# Patient Record
Sex: FEMALE | Race: OTHER | Employment: FULL TIME | URBAN - METROPOLITAN AREA
[De-identification: names, ages, dates, MRNs, and addresses within clinical notes are randomized per-mention and may not be internally consistent; named-entity substitution may affect disease eponyms.]

---

## 2020-11-08 ENCOUNTER — HOSPITAL ENCOUNTER (EMERGENCY)
Age: 25
Discharge: HOME | End: 2020-11-08
Attending: STUDENT IN AN ORGANIZED HEALTH CARE EDUCATION/TRAINING PROGRAM
Payer: SUBSIDIZED

## 2020-11-08 ENCOUNTER — APPOINTMENT (OUTPATIENT)
Dept: GENERAL RADIOLOGY | Age: 25
End: 2020-11-08
Attending: STUDENT IN AN ORGANIZED HEALTH CARE EDUCATION/TRAINING PROGRAM
Payer: SUBSIDIZED

## 2020-11-08 VITALS
TEMPERATURE: 98.4 F | SYSTOLIC BLOOD PRESSURE: 120 MMHG | BODY MASS INDEX: 25.76 KG/M2 | HEIGHT: 62 IN | WEIGHT: 140 LBS | HEART RATE: 87 BPM | OXYGEN SATURATION: 99 % | DIASTOLIC BLOOD PRESSURE: 63 MMHG | RESPIRATION RATE: 16 BRPM

## 2020-11-08 DIAGNOSIS — S93.402A SPRAIN OF LEFT ANKLE, UNSPECIFIED LIGAMENT, INITIAL ENCOUNTER: Primary | ICD-10-CM

## 2020-11-08 PROCEDURE — 73610 X-RAY EXAM OF ANKLE: CPT

## 2020-11-08 PROCEDURE — 99282 EMERGENCY DEPT VISIT SF MDM: CPT

## 2020-11-08 RX ORDER — IBUPROFEN 800 MG/1
800 TABLET ORAL
Qty: 20 TAB | Refills: 0 | Status: SHIPPED | OUTPATIENT
Start: 2020-11-08 | End: 2020-11-15

## 2020-11-08 NOTE — DISCHARGE INSTRUCTIONS
Patient Education        Ankle Sprain: Care Instructions  Your Care Instructions     An ankle sprain can happen when you twist your ankle. The ligaments that support the ankle can get stretched and torn. Often the ankle is swollen and painful. Ankle sprains may take from several weeks to several months to heal. Usually, the more pain and swelling you have, the more severe your ankle sprain is and the longer it will take to heal. You can heal faster and regain strength in your ankle with good home treatment. It is very important to give your ankle time to heal completely, so that you do not easily hurt your ankle again. Follow-up care is a key part of your treatment and safety. Be sure to make and go to all appointments, and call your doctor if you are having problems. It's also a good idea to know your test results and keep a list of the medicines you take. How can you care for yourself at home? · Prop up your foot on pillows as much as possible for the next 3 days. Try to keep your ankle above the level of your heart. This will help reduce the swelling. · Follow your doctor's directions for wearing a splint or elastic bandage. Wrapping the ankle may help reduce or prevent swelling. · Your doctor may give you a splint, a brace, an air stirrup, or another form of ankle support to protect your ankle until it is healed. Wear it as directed while your ankle is healing. Do not remove it unless your doctor tells you to. After your ankle has healed, ask your doctor whether you should wear the brace when you exercise. · Put ice or cold packs on your injured ankle for 10 to 20 minutes at a time. Try to do this every 1 to 2 hours for the next 3 days (when you are awake) or until the swelling goes down. Put a thin cloth between the ice and your skin. · You may need to use crutches until you can walk without pain. If you do use crutches, try to bear some weight on your injured ankle if you can do so without pain.  This helps the ankle heal.  · Take pain medicines exactly as directed. ? If the doctor gave you a prescription medicine for pain, take it as prescribed. ? If you are not taking a prescription pain medicine, ask your doctor if you can take an over-the-counter medicine. · If you have been given ankle exercises to do at home, do them exactly as instructed. These can promote healing and help prevent lasting weakness. When should you call for help? Call your doctor now or seek immediate medical care if:    · Your pain is getting worse.     · Your swelling is getting worse.     · Your splint feels too tight or you are unable to loosen it. Watch closely for changes in your health, and be sure to contact your doctor if:    · You are not getting better after 1 week. Where can you learn more? Go to http://www.gray.com/  Enter Q745 in the search box to learn more about \"Ankle Sprain: Care Instructions. \"  Current as of: March 2, 2020               Content Version: 12.6  © 1461-4402 [employer]. Care instructions adapted under license by [employer] (which disclaims liability or warranty for this information). If you have questions about a medical condition or this instruction, always ask your healthcare professional. [place] [address] any warranty or liability for your use of this information. Patient Education        Ankle Sprain: Rehab Exercises  Introduction  Here are some examples of exercises for you to try. The exercises may be suggested for a condition or for rehabilitation. Start each exercise slowly. Ease off the exercises if you start to have pain. You will be told when to start these exercises and which ones will work best for you. How to do the exercises  \"Alphabet\" exercise   1. Trace the alphabet with your toe. This helps your ankle move in all directions. Side-to-side knee swing exercise   1.  Sit in a chair with your foot flat on the floor. 2. Slowly move your knee from side to side. Keep your foot pressed flat. 3. Continue this exercise for 2 to 3 minutes. Towel curl   1. While sitting, place your foot on a towel on the floor. Scrunch the towel toward you with your toes. 2. Then use your toes to push the towel away from you. 3. To make this exercise more challenging you can put something on the other end of the towel. A can of soup is about the right weight for this. Towel stretch   1. Sit with your legs extended and knees straight. 2. Place a towel around your foot just under the toes. 3. Hold each end of the towel in each hand, with your hands above your knees. 4. Pull back with the towel so that your foot stretches toward you. 5. Hold the position for at least 15 to 30 seconds. 6. Repeat 2 to 4 times a session. Do up to 5 sessions a day. Ankle eversion exercise   1. Start by sitting with your foot flat on the floor. Push your foot outward against a wall or a piece of furniture that doesn't move. Hold for about 6 seconds, and relax. Repeat 8 to 12 times. 2. After you feel comfortable with this, try using rubber tubing looped around the outside of your feet for resistance. Push your foot out to the side against the tubing, and then count to 10 as you slowly bring your foot back to the middle. Repeat 8 to 12 times. Isometric opposition exercises   1. While sitting, put your feet together flat on the floor. 2. Press your injured foot inward against your other foot. Hold for about 6 seconds, and relax. Repeat 8 to 12 times. 3. Then place the heel of your other foot on top of the injured one. Push down with the top heel while trying to push up with your injured foot. Hold for about 6 seconds, and relax. Repeat 8 to 12 times. Resisted ankle inversion   1. Sit on the floor with your good leg crossed over your other leg. 2. Hold both ends of an exercise band and loop the band around the inside of your affected foot. Then press your other foot against the band. 3. Keeping your legs crossed, slowly push your affected foot against the band so that foot moves away from your other foot. Then slowly relax. 4. Repeat 8 to 12 times. Resisted ankle eversion   1. Sit on the floor with your legs straight. 2. Hold both ends of an exercise band and loop the band around the outside of your affected foot. Then press your other foot against the band. 3. Keeping your leg straight, slowly push your affected foot outward against the band and away from your other foot without letting your leg rotate. Then slowly relax. 4. Repeat 8 to 12 times. Resisted ankle dorsiflexion   1. Tie the ends of an exercise band together to form a loop. Attach one end of the loop to a secure object or shut a door on it to hold it in place. (Or you can have someone hold one end of the loop to provide resistance.)  2. While sitting on the floor or in a chair, loop the other end of the band over the top of your affected foot. 3. Keeping your knee and leg straight, slowly flex your foot to pull back on the exercise band, and then slowly relax. 4. Repeat 8 to 12 times. Single-leg balance   1. Stand on a flat surface with your arms stretched out to your sides like you are making the letter \"T. \" Then lift your good leg off the floor, bending it at the knee. If you are not steady on your feet, use one hand to hold on to a chair, counter, or wall. 2. Standing on the leg with your affected ankle, keep that knee straight. Try to balance on that leg for up to 30 seconds. Then rest for up to 10 seconds. 3. Repeat 6 to 8 times. 4. When you can balance on your affected leg for 30 seconds with your eyes open, try to balance on it with your eyes closed. 5. When you can do this exercise with your eyes closed for 30 seconds and with ease and no pain, try standing on a pillow or piece of foam, and repeat steps 1 through 4.     Follow-up care is a key part of your treatment and safety. Be sure to make and go to all appointments, and call your doctor if you are having problems. It's also a good idea to know your test results and keep a list of the medicines you take. Where can you learn more? Go to http://www.gray.com/  Enter Yung Galdamez in the search box to learn more about \"Ankle Sprain: Rehab Exercises. \"  Current as of: March 2, 2020               Content Version: 12.6  © 2006-2020 [employer]. Care instructions adapted under license by [employer] (which disclaims liability or warranty for this information). If you have questions about a medical condition or this instruction, always ask your healthcare professional. [address] any warranty or liability for your use of this information.

## 2020-11-08 NOTE — ED NOTES
I have reviewed discharge instructions with the patient. The patient verbalized understanding. Patient left ED via Discharge Method: ambulatory to Home with family. Opportunity for questions and clarification provided. Patient given 1 scripts. To continue your aftercare when you leave the hospital, you may receive an automated call from our care team to check in on how you are doing. This is a free service and part of our promise to provide the best care and service to meet your aftercare needs.  If you have questions, or wish to unsubscribe from this service please call [phone]. Thank you for Choosing our [facility] Emergency Department.

## 2020-11-08 NOTE — ED PROVIDER NOTES
78-year-old female patient presents with reports of left ankle pain after fall yesterday. Patient states she was playing volleyball and stepped awkwardly, describing an inversion injury of her left ankle. She denies any other injury at the time. Notes swelling and pain to the lateral aspect of the left ankle. She also notes reduced range of motion in the third fourth and fifth toe on this foot. No other complaints at this time. History reviewed. No pertinent past medical history. History reviewed. No pertinent surgical history. History reviewed. No pertinent family history.     Social History     Socioeconomic History    Marital status: Not on file     Spouse name: Not on file    Number of children: Not on file    Years of education: Not on file    Highest education level: Not on file   Occupational History    Not on file   Social Needs    Financial resource strain: Not on file    Food insecurity     Worry: Not on file     Inability: Not on file    Transportation needs     Medical: Not on file     Non-medical: Not on file   Tobacco Use    Smoking status: Never Smoker    Smokeless tobacco: Never Used   Substance and Sexual Activity    Alcohol use: Never     Frequency: Never    Drug use: Never    Sexual activity: Not on file   Lifestyle    Physical activity     Days per week: Not on file     Minutes per session: Not on file    Stress: Not on file   Relationships    Social connections     Talks on phone: Not on file     Gets together: Not on file     Attends [employer] service: Not on file     Active member of club or organization: Not on file     Attends meetings of clubs or organizations: Not on file     Relationship status: Not on file    Intimate partner violence     Fear of current or ex partner: Not on file     Emotionally abused: Not on file     Physically abused: Not on file     Forced sexual activity: Not on file   Other Topics Concern    Not on file   Social History Narrative    Not on file         ALLERGIES: Patient has no known allergies. Review of Systems   Constitutional: Negative for chills, diaphoresis and fever. HENT: Negative for congestion, sneezing and sore throat. Eyes: Negative for visual disturbance. Respiratory: Negative for cough, chest tightness, shortness of breath and wheezing. Cardiovascular: Negative for chest pain and leg swelling. Gastrointestinal: Negative for abdominal pain, blood in stool, diarrhea, nausea and vomiting. Endocrine: Negative for polyuria. Genitourinary: Negative for difficulty urinating, dysuria, flank pain, hematuria and urgency. Musculoskeletal: Positive for arthralgias and joint swelling. Negative for back pain, myalgias, neck pain and neck stiffness. Skin: Negative for color change and rash. Neurological: Negative for dizziness, syncope, speech difficulty, weakness, light-headedness, numbness and headaches. Psychiatric/Behavioral: Negative for behavioral problems. All other systems reviewed and are negative. Vitals:    11/08/20 0915   BP: 120/63   Pulse: 87   Resp: 16   Temp: 98.4 °F (36.9 °C)   SpO2: 99%   Weight: 63.5 kg (140 lb)   Height: 5' 2\" (1.575 m)            Physical Exam  Vitals signs and nursing note reviewed. Constitutional:       Appearance: Normal appearance. HENT:      Head: Normocephalic and atraumatic. Nose: Nose normal.      Mouth/Throat:      Mouth: Mucous membranes are dry. Eyes:      Extraocular Movements: Extraocular movements intact. Pupils: Pupils are equal, round, and reactive to light. Neck:      Musculoskeletal: Normal range of motion. Cardiovascular:      Pulses: Normal pulses. Heart sounds: Normal heart sounds. Pulmonary:      Effort: Pulmonary effort is normal. No respiratory distress. Breath sounds: Normal breath sounds. Abdominal:      General: Abdomen is flat. Musculoskeletal: Normal range of motion.       Comments: Swelling to the lateral aspect of the left ankle with pain on palpation. Anterior tibial, Dorsalis pedis pulses easily palpable, distal cap refill normal, no evidence of significant ecchymosis or other deformity on exam.   Skin:     General: Skin is warm and dry. Capillary Refill: Capillary refill takes less than 2 seconds. Neurological:      General: No focal deficit present. Mental Status: She is alert. Psychiatric:         Mood and Affect: Mood normal.          MDM  Number of Diagnoses or Management Options  Sprain of left ankle, unspecified ligament, initial encounter: new and does not require workup  Diagnosis management comments: X-ray imaging shows no evidence of acute fracture or dislocation. Symptoms consistent with ankle sprain. Patient already has crutches to use. Will place her in a stirrup splint and referred to orthopedics. She states pain is been well controlled with ibuprofen at home. Encouraged the regular use of ice and elevation as well. Voice dictation software was used during the making of this note. This software is not perfect and grammatical and other typographical errors may be present. This note has been proofread, but may still contain errors.   Kelly Barrera DO; 11/8/2020 @10:29 AM   ===================================================================         Amount and/or Complexity of Data Reviewed  Independent visualization of images, tracings, or specimens: yes    Risk of Complications, Morbidity, and/or Mortality  Presenting problems: moderate  Diagnostic procedures: low  Management options: moderate    Patient Progress  Patient progress: stable         Procedures

## 2020-11-08 NOTE — ED TRIAGE NOTES
Patient ad vises playing volleyball yesterday and stepped back into a hole and fell. Patient complaining of left ankle pain with swelling present. Patient with mask on during triage. Patient denies any loss of consciousness during event.

## 2022-08-28 ENCOUNTER — APPOINTMENT (OUTPATIENT)
Dept: GENERAL RADIOLOGY | Age: 27
End: 2022-08-28

## 2022-08-28 ENCOUNTER — HOSPITAL ENCOUNTER (EMERGENCY)
Age: 27
Discharge: HOME | End: 2022-08-28
Attending: EMERGENCY MEDICINE | Admitting: EMERGENCY MEDICINE

## 2022-08-28 VITALS
SYSTOLIC BLOOD PRESSURE: 125 MMHG | BODY MASS INDEX: 24.84 KG/M2 | TEMPERATURE: 99.1 F | DIASTOLIC BLOOD PRESSURE: 84 MMHG | HEART RATE: 95 BPM | OXYGEN SATURATION: 100 % | RESPIRATION RATE: 15 BRPM | WEIGHT: 135 LBS | HEIGHT: 62 IN

## 2022-08-28 DIAGNOSIS — S92.355A CLOSED NONDISPLACED FRACTURE OF FIFTH METATARSAL BONE OF LEFT FOOT, INITIAL ENCOUNTER: Primary | ICD-10-CM

## 2022-08-28 PROCEDURE — 73630 X-RAY EXAM OF FOOT: CPT

## 2022-08-28 PROCEDURE — 99283 EMERGENCY DEPT VISIT LOW MDM: CPT | Performed by: EMERGENCY MEDICINE

## 2022-08-28 PROCEDURE — 73610 X-RAY EXAM OF ANKLE: CPT

## 2022-08-28 RX ORDER — IBUPROFEN 800 MG/1
800 TABLET ORAL
Qty: 21 TABLET | Refills: 0 | Status: SHIPPED | OUTPATIENT
Start: 2022-08-28 | End: 2022-09-04

## 2022-08-28 ASSESSMENT — PAIN SCALES - GENERAL: PAINLEVEL_OUTOF10: 2

## 2022-08-28 ASSESSMENT — ENCOUNTER SYMPTOMS: SHORTNESS OF BREATH: 0

## 2022-08-28 ASSESSMENT — PAIN DESCRIPTION - LOCATION: LOCATION: ANKLE

## 2022-08-28 ASSESSMENT — PAIN DESCRIPTION - ORIENTATION: ORIENTATION: LEFT

## 2022-08-28 ASSESSMENT — PAIN DESCRIPTION - DESCRIPTORS: DESCRIPTORS: ACHING

## 2022-08-28 ASSESSMENT — PAIN - FUNCTIONAL ASSESSMENT: PAIN_FUNCTIONAL_ASSESSMENT: 0-10

## 2022-08-28 NOTE — DISCHARGE INSTRUCTIONS
You need to wear the walking boot anytime you are up ambulating and use crutches, you are to be nonweightbearing until you see orthopedics. Ice and elevate to help with swelling. Alternate tylenol and motrin as needed for fever or body aches. You can take tylenol every 4 hours as needed. You can take ibuprofen every 8 hours as needed. Schedule a follow up appointment with [address] [place] [address] with Dr. Bobo Gerardo or Dr. Jenifer Mcarthur. Return to the ER for any new or worsening symptoms.

## 2022-08-28 NOTE — ED NOTES
I have reviewed discharge instructions with the patient. The patient verbalized understanding. Patient left ED via Discharge Method: wheelchair to Home with family    Opportunity for questions and clarification provided. Patient given 1 scripts. To continue your aftercare when you leave the hospital, you may receive an automated call from our care team to check in on how you are doing. This is a free service and part of our promise to provide the best care and service to meet your aftercare needs.  If you have questions, or wish to unsubscribe from this service please call [phone]. Thank you for Choosing our [facility] Emergency Department.         Lonny Hernandez RN  08/28/22 1261

## 2022-08-28 NOTE — ED PROVIDER NOTES
[facility] Emergency Department Provider Note                   PCP:                None Provider               Age: 32 y.o. Sex: female       ICD-10-CM    1. Closed nondisplaced fracture of fifth metatarsal bone of left foot, initial encounter  S92.355A           DISPOSITION Decision To Discharge 08/28/2022 04:30:00 PM        MDM  Number of Diagnoses or Management Options  Closed nondisplaced fracture of fifth metatarsal bone of left foot, initial encounter  Diagnosis management comments:     Overall patient is a well-appearing 64-year-old female who presented today for evaluation of left foot injury. On exam there is some swelling, ecchymosis and tenderness noted to the dorsum of the left foot at the proximal aspect over the fourth and fifth metatarsals. She is neurovascularly intact distally. Her x-rays today show a proximal fifth metatarsal fracture. I discussed with on-call orthopedic PA who recommended a walking boot and crutches, patient is to be nonweightbearing and will follow-up in the office. Discussed the plan with the patient who is in agreement with the plan. Amount and/or Complexity of Data Reviewed  Tests in the radiology section of CPT®: ordered and reviewed         Orders Placed This Encounter   Procedures    XR ANKLE LEFT (MIN 3 VIEWS)    XR FOOT LEFT (MIN 3 VIEWS)        Medications - No data to display    Discharge Medication List as of 8/28/2022  4:42 PM        START taking these medications    Details   ibuprofen (ADVIL;MOTRIN) 800 MG tablet Take 1 tablet by mouth 3 times daily (with meals) for 7 days, Disp-21 tablet, R-0Print              Marv Beltrán is a 32 y.o. female who presents to the Emergency Department with chief complaint of    Chief Complaint   Patient presents with    Ankle Pain      64-year-old well-appearing female presents today for evaluation of left foot pain and swelling.   She states that 2 days ago she was playing soccer and she felt herself \"rolled the ankle\". She states that she has had some pain in the left ankle and foot region ever since making it difficult to ambulate. She noticed some increased swelling and ecchymosis today which is what prompted her visit. Pain is worsened with weightbearing, improved with rest.  She denies any numbness or tingling in the foot. Reports previous sprained ankle but no history of previous surgery on this ankle or foot. The history is provided by the patient. No  was used. Review of Systems   Constitutional:  Negative for activity change. Respiratory:  Negative for shortness of breath. Musculoskeletal:  Positive for joint swelling. Left ankle and foot pain   Allergic/Immunologic: Negative for immunocompromised state. Neurological:  Negative for weakness and numbness. Hematological:  Does not bruise/bleed easily. No past medical history on file. No past surgical history on file. No family history on file. Social History     Socioeconomic History    Marital status: Single   Tobacco Use    Smoking status: Never    Smokeless tobacco: Never   Substance and Sexual Activity    Alcohol use: Never    Drug use: Never         Patient has no known allergies. Discharge Medication List as of 8/28/2022  4:42 PM           Vitals signs and nursing note reviewed. No data found. Physical Exam  Vitals and nursing note reviewed. Constitutional:       General: She is not in acute distress. Appearance: Normal appearance. HENT:      Head: Normocephalic and atraumatic. Eyes:      Conjunctiva/sclera: Conjunctivae normal.   Pulmonary:      Effort: Pulmonary effort is normal.   Musculoskeletal:      Left ankle: No swelling or ecchymosis. Normal range of motion. Left foot: Normal capillary refill. Swelling and bony tenderness present. Normal pulse. Legs:    Skin:     General: Skin is warm and dry.       Capillary Refill: Capillary refill takes less than 2 seconds. Neurological:      General: No focal deficit present. Mental Status: She is alert and oriented to person, place, and time. Psychiatric:         Mood and Affect: Mood normal.         Thought Content: Thought content normal.         Judgment: Judgment normal.        Procedures      [unfilled]     XR FOOT LEFT (MIN 3 VIEWS)   Final Result   Proximal fifth metatarsal fracture         XR ANKLE LEFT (MIN 3 VIEWS)   Final Result   Proximal fifth metatarsal fracture                          ED Course as of 08/28/22 1956   Sun Aug 28, 2022   0799 INDICATION: Trauma, left ankle pain     Three views of the left ankle were obtained     FINDINGS: There is a nondisplaced fracture of the proximal fifth metatarsal.  No  other fractures are seen. Ankle joint is intact. IMPRESSION:  Proximal fifth metatarsal fracture [KE]   1640 Discussed with on-call orthopedic PA who states patient is okay to use walking boot, but needs to remain nonweightbearing with crutches and follow-up with Dr. Rae Ramachandran or Dr. Nathen Flores in the office. Discussed the plan with the patient she is in agreement. She has her own crutches and her own walking boot at home from a previous injury that she will use. [KE]      ED Course User Index  [KE] BRO Durham        Voice dictation software was used during the making of this note. This software is not perfect and grammatical and other typographical errors may be present. This note has not been completely proofread for errors.        Sahra Durham  08/28/22 1956

## 2022-08-28 NOTE — ED TRIAGE NOTES
Pt arrives via pov c/o left ankle pain while playing soccer on Friday. Pt states ankle has increased swelling and worsening pain.

## 2022-09-02 ENCOUNTER — OFFICE VISIT (OUTPATIENT)
Dept: ORTHOPEDIC SURGERY | Age: 27
End: 2022-09-02

## 2022-09-02 DIAGNOSIS — S92.355A CLOSED NONDISPLACED FRACTURE OF FIFTH METATARSAL BONE OF LEFT FOOT, INITIAL ENCOUNTER: Primary | ICD-10-CM

## 2022-09-02 PROCEDURE — 99203 OFFICE O/P NEW LOW 30 MIN: CPT | Performed by: ORTHOPAEDIC SURGERY

## 2022-09-02 NOTE — PROGRESS NOTES
Name: Cassius Bates  YOB: 1995  Gender: female  MRN: [id number]    Summary:   Left fifth metatarsal base fracture       CC: Foot Injury (Left 5th metatarsal fx f/u - printed xrays)       HPI: Cassius Bates is a 32 y.o. female who presents with Foot Injury (Left 5th metatarsal fx f/u - printed xrays)  . This patient presents today with a history of a left fifth metatarsal base fracture. She is seen in urgent care and diagnosed with a fracture placed in a walker boot. She is here for orthopedic follow-up. History was obtained by Patient     ROS/Meds/PSH/PMH/FH/SH: I personally reviewed the patients standard intake form. Below are the pertinents    Tobacco:  reports that she has never smoked. She has never used smokeless tobacco.  Diabetes: None      Physical Examination:    Exam of the left foot and ankle shows expected swelling and tenderness palpation the fifth metatarsal base. Her ankle is nontender but she does have some instability to talar tilt testing consistent with her previous history of instability and sprains. Imaging:   I independently interpreted XR ordered by a different physician, taken from an outside facility of the left foot and ankle which shows 1/5 metatarsal base nondisplaced fracture in zone 1          Assessment:   Left fifth metatarsal base fracture    Treatment Plan:   3 This is stable chronic illness/condition  Treatment at this time: Time with no intervention  Studies ordered: Foot XR needed @ Next Visit    Weight-bearing status: WBAT        Return to work/work restrictions: none  No medications given    She is can weight-bear as tolerated in her boot and wean off her crutches as tolerated.   She will return in 6 weeks for an x-ray we will start a home exercise program.

## 2022-10-14 ENCOUNTER — OFFICE VISIT (OUTPATIENT)
Dept: ORTHOPEDIC SURGERY | Age: 27
End: 2022-10-14

## 2022-10-14 DIAGNOSIS — S92.355A CLOSED NONDISPLACED FRACTURE OF FIFTH METATARSAL BONE OF LEFT FOOT, INITIAL ENCOUNTER: Primary | ICD-10-CM

## 2022-10-14 PROCEDURE — 99213 OFFICE O/P EST LOW 20 MIN: CPT | Performed by: ORTHOPAEDIC SURGERY

## 2022-10-14 NOTE — PROGRESS NOTES
Name: Margie Underwood  YOB: 1995  Gender: female  MRN: [id number]    Summary:   Left fifth metatarsal base fracture       CC: No chief complaint on file. HPI: Margie Underwood is a 32 y.o. female who presents with No chief complaint on file. .  Left fifth metatarsal base fracture, doing well with very little pain in her walker boot. History was obtained by Patient     ROS/Meds/PSH/PMH/FH/SH: I personally reviewed the patients standard intake form. Below are the pertinents    Tobacco:  reports that she has never smoked. She has never used smokeless tobacco.  Diabetes: None      Physical Examination:    Exam of the left foot and ankle shows no swelling and tenderness palpation of the fifth metatarsal base    Imaging:   I independently interpreted XR taken today  Left foot XR: AP, Lateral, Oblique views     ICD-10-CM    1. Closed nondisplaced fracture of fifth metatarsal bone of left foot, initial encounter  S92.355A XR FOOT LEFT (MIN 3 VIEWS)         Report: AP, lateral, oblique x-ray of the left foot demonstrates healing fracture    Impression: Healing fracture   Ananda Sanchez III, MD           Assessment:   Left fifth metatarsal base healing fracture    Treatment Plan:   4 This is a chronic illness/condition with exacerbation and progression  Treatment at this time: Physical Therapy  Studies ordered: NO XR needed @ Next Visit    Weight-bearing status: WBAT        Return to work/work restrictions: none  No medications given    She will wean from her walker boot and start advancing her exercise regimen. She try to go to supervised physical therapy. She can return as needed.

## 2022-10-19 ENCOUNTER — HOSPITAL ENCOUNTER (OUTPATIENT)
Dept: PHYSICAL THERAPY | Age: 27
Setting detail: RECURRING SERIES
Discharge: HOME | End: 2022-10-22

## 2022-10-19 PROCEDURE — 97161 PT EVAL LOW COMPLEX 20 MIN: CPT

## 2022-10-19 PROCEDURE — 97110 THERAPEUTIC EXERCISES: CPT

## 2022-10-19 NOTE — THERAPY EVALUATION
Solomon Aschoff  : 1995  Primary:   Secondary:  [address] @ [address] [address]  Phone: [phone]  Fax: [phone] Plan Frequency: 2x per week for 90 days  Plan of Care/Certification Expiration Date: 23    PT Visit Info: Total # of Visits to Date: 1  PT Insurance Information: financial adie until 3/31/23  Progress Note Counter: 1    Visit Count:  1    OUTPATIENT PHYSICAL THERAPY:OP NOTE TYPE: Initial Assessment 10/19/2022               Episode  Appt Desk         Treatment Diagnosis:      Pain in left ankle and joints of left foot (M25.572)   Pain in left foot (W80.031)   Difficulty in walking, not elsewhere classified (R26.2)  Muscle weakness (generalized) (M62.81)      Medical/Referring Diagnosis:  Closed nondisplaced fracture of fifth metatarsal bone of left foot, initial encounter [D16.142T]  Referring Physician:  Raina Asher MD MD Orders:  PT Eval and Treat   Return MD Appt:  TBD by pt  Date of Onset:  Onset Date: 22   Allergies:  Patient has no known allergies. Restrictions/Precautions:  WBAT, can wean from walker boot         Medications Last Reviewed:  10/19/2022     SUBJECTIVE   History of Injury/Illness (Reason for Referral):  On 22, pt was playing soccer and felt like she stepped wrong and felt immediate pain. Pt unable to tolerate Wbing following. Pt went to ER after 1.5 days following injury. Pt was then referred to Dr. Umair Martin. Pt in boot for approximately 5-6 weeks and began weaning since Friday (3-4 days now). Pt continues with limp more due to weakness than painful. Pt still wearing lace up ankle brace. Pt c/c of weakness at both L foot and ankle, no pain. Pt had prior ankle sprain 2 years ago. Pt is avid runner, rock climber, soccer, volleyball.  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 female who presents with No chief complaint on file. .  Left fifth metatarsal base fracture, doing well with very little pain in her walker boot. Weight-bearing status: WBAT        Return to work/work restrictions: none  No medications given     She will wean from her walker boot and start advancing her exercise regimen. She try to go to supervised physical therapy. She can return as needed. Patient Stated Goal(s):  \"Get back to all activities without limitations \"  Initial:   0   /10 Post Session:     0 /10  Past Medical History/Comorbidities:   Ms. Jenni Hatch  has no past medical history on file. Ms. Jenni Hatch  has no past surgical history on file. Social History/Living Environment:    Lives in girls dorm- works at [address] [place] [address]     Prior Level of Function/Work/Activity:    Very active, runner, volleyball, soccer, rock climber        Learning:   Does the patient/guardian have any barriers to learning?: No barriers  Will there be a co-learner?: No  What is the preferred language of the patient/guardian?: English  How does the patient/guardian prefer to learn new concepts?: Listening; Demonstration; Pictures/Videos; Reading     Fall Risk Scale:    Brown Total Score: 0  Brown Fall Risk: Low (0-24)           OBJECTIVE   Ankle Objective      BALANCE (SLS) Date:   10/19/22 Date: Date:   Right 30 seconds     Left 4 seconds          MEASUREMENTS:          Date:  10/19/22  Date:  Date:     Right Left Right Left Right Left   Ankle Circumference 18.8 cm 18.8 cm       Figure 8 48.4 48.2         AROM/PROM         Joint: Date:  Date:  Date:    Active LE ROM Right Left Right Left Right Left   Hip Flexion         Hip Extension         Hip IR         Hip ER         Knee Extension         Knee Flexion         Ankle DF 20 degrees 15 degrees       Ankle PF 30 degrees 30 degrees       Ankle IV 30 degrees 25 degrees       Ankle EV 35 degrees 20 degrees       Passive LE ROM         Ankle PF [facility] Ankle DF [facility] WFL       [facility] [facility]       Ankle EV [facility] WFL                   STRENGTH         Joint: Date: 10/19/22    Date:  Date:     Right Left Right Left Right Left   Hip Abduction         Hip Adduction         Hip IR         Hip ER         Hip Flexion         Knee Extension         Knee Flexion         Ankle DF 5/5 4/5       Ankle PF 5/5 3+/5       Ankle IV 5/5 4/5       Ankle EV 5/5 3/5           PALPATION Date:  10/19/22   TTP No TTP    TONE None noted       ASSESSMENT   Initial Assessment:  Ms. Kenzie Butsos presents to physical therapy with c/o L foot instability and weakness following 5th MTP fracture. Pt demonstrating decreased strength, ROM, joint mobility, functional mobility affecting participation in ADLs and functional mobility at this time. Patient will benefit from skilled physical therapy for manual therapeutic techniques (as appropriate), therapeutic exercises and activities, balance and comprehensive home exercises program to address current impairments and functional limitations. Solomon Aschoff will benefit from skilled PT (medically necessary) in order to address above deficits affecting participation in basic ADLs and overall functional tolerance. Problem List: (Impacting functional limitations): Body Structures, Functions, Activity Limitations Requiring Skilled Therapeutic Intervention: Decreased functional mobility ; Decreased ROM; Decreased tolerance to work activity; Decreased strength; Decreased endurance; Decreased balance; Increased pain     Therapy Prognosis:   Therapy Prognosis: Good     Assessment Complexity:   Low Complexity  PLAN   Effective Dates: 10/19/22 TO Plan of Care/Certification Expiration Date: 01/17/23   Frequency/Duration: Plan Frequency: 2x per week for 90 days   Interventions Planned (Treatment may consist of any combination of the following):    Current Treatment Recommendations: Strengthening; ROM; Balance training;  Endurance training; Gait training; Stair training; Neuromuscular re-education; Manual Therapy - Soft Tissue Mobilization; Manual Therapy - Joint Manipulation; Pain management; Home exercise program; Patient/Caregiver education & training; Modalities     Goals: (Goals have been discussed and agreed upon with patient.)  Ankle Goals  SHORT-TERM FUNCTIONAL GOALS: Time Frame: 4 weeks  Teresa Peterson will be compliant with home exercise program within 4 weeks in order to improve active participation with management of patient's symptoms and/or functional deficits. Rosemary Ramachandran will demonstrate 15 second of SLS on L LE for improvements in balance and strength at L foot  Rosemary Ramachandran will be able to stand >=60 minutes with <2010 pain to feet in order to participate in work tasks without issues/compromise. Rosemary Ramachandran will demonstrate 5/5 ankle strength for improved gait mechanics  Teresa Peterson  will improve L ankle dorsiflexion AROM from  to 20 in order to show improvement in ankle ROM and tolerance for functional activity. Rosemary Ramachandran will be report improved score on the Foot and Ankle Ability Measure 70 to indicate improvement in functional independence. DISCHARGE GOALS: Time Frame: 12 weeks  Teresa Peterson will be independent with home exercise program within 12 weeks in order to improve independence with management of patient's symptoms and/or functional deficits. Rosemary Ramachandran will report no limitations with standing ability for work tasks (5-6 hours)  Rosemary Ramachandran will be able to jump up and down from 8\" step for return to sports  Rosemary Ramachandran will be report improved score on the Foot and Ankle Ability Measure 80 to indicate improvement in functional independence. Rosemary Ramachandran will demonstrate 30 seconds SLS while performing dynamic activity with normalized ankle strategy for return to sport           Outcome Measure:    Tool Used: FOOT AND ANKLE ABILITY MEASURE  Score:  Initial: 59/84 Most Recent: X (Date: -- )   Interpretation of Score: For the \"Activities of Daily Living\", there are 21 questions each scored on a 5 point scale with 0 representing \"Unable to do\" and 4 representing \"No difficulty\". The lower the score, the greater the functional disability. 84/84 represents no disability. Minimal detectable change is 5.7 points. With the addition of the 8 questions in the \"Sports Subscale,\" there are 29 questions, each scored on a 5 point scale with 0 representing \"Unable to do\" and 4 representing \"No difficulty\". The lower the score, the greater the functional disability. 116/116 represents no disability. Minimal detectable change is 12.3 points. Medical Necessity:   Skilled intervention continues to be required due to decreased strength, balance, ROM and pain for return to PLOF and functional mobility. Reason For Services/Other Comments:  Patient continues to require skilled intervention due to patient continues to present with impairments assessed at initial evaluation and requiring skilled physical therapy to meet goals for PT. Total Duration:  Time In: 0840  Time Out: 0719    Regarding Teresa Guillaume therapy, I certify that the treatment plan above will be carried out by a therapist or under their direction.   Thank you for this referral,  Pattie Pandey, PT     Referring Physician Signature: Kevin Amaya MD _______________________________ Date _____________        Post Session Pain  Charge Capture  PT Visit Info MD Guidelines  MyChart

## 2022-10-19 NOTE — PROGRESS NOTES
Leonor Purvis  : 1995  Primary:   Secondary:  [address] [address]  Phone: [phone]  Fax: [phone] Plan Frequency: 2x per week for 90 days    Plan of Care/Certification Expiration Date: 23      PT Visit Info: Total # of Visits to Date: 1  PT Insurance Information: financial adie until 3/31/23  Progress Note Counter: 1     Visit Count:  1   OUTPATIENT PHYSICAL THERAPY:OP NOTE TYPE: Treatment Note 10/19/2022       Episode  }Appt Desk             Treatment Diagnosis:    Pain in left ankle and joints of left foot (M25.572)   Pain in left foot (T61.450)   Difficulty in walking, not elsewhere classified (R26.2)  Muscle weakness (generalized) (M62.81)  Medical/Referring Diagnosis:  Closed nondisplaced fracture of fifth metatarsal bone of left foot, initial encounter [E93.462D]  Referring Physician:  Marisa Yusuf MD MD Orders:  PT Eval and Treat   Date of Onset:  Onset Date: 22     Allergies:   Patient has no known allergies. Restrictions/Precautions:  No data recorded  No data recorded   Interventions Planned (Treatment may consist of any combination of the following):    Current Treatment Recommendations: Strengthening; ROM; Balance training; Endurance training; Gait training; Stair training; Neuromuscular re-education; Manual Therapy - Soft Tissue Mobilization; Manual Therapy - Joint Manipulation; Pain management; Home exercise program; Patient/Caregiver education & training; Modalities     Subjective Comments: Pt with c/o ankle instability and weakness     Initial:}  0   /10Post Session:    0    /10  Medications Last Reviewed:  10/19/2022  Updated Objective Findings:  See evaluation note from today  Treatment   THERAPEUTIC EXERCISE: (25 minutes):  Exercises per grid below to improve mobility, strength and balance.   Required minimal-moderate visual and verbal cues to promote proper body alignment and promote proper body posture. Progressed resistance, range and complexity of movement as indicated. Date:  10/19/22 Date:   Date:     Activity/Exercise Parameters Parameters Parameters   Intrinsic foot arch lifts HEP     Towel scrunches HEP     Ankle TB 4 way HEP                               MANUAL THERAPY: (0 minutes): Joint mobilization, Soft tissue mobilization and Manipulation was utilized and necessary because of the patient's restricted joint motion, painful spasm, loss of articular motion and restricted motion of soft tissue. (Used abbreviations: MET - muscle energy technique; PNF - proprioceptive neuromuscular facilitation; NMR - neuromuscular re-education; AP - anterior to posterior; PA - posterior to anterior)    MODALITIES: (0 minutes):        none        Treatment/Session Summary:    Treatment Assessment: Initiated performance of therex today. Pt demonstrate and tolerated well today. Provided pt with RTB and GTB for ankle 4 way exercise. Pt main deficits in strength and balance. Communication/Consultation:   educated pt on POC and HEP  Equipment provided today:  None  Recommendations/Intent for next treatment session: Next visit will focus on Advancements to more challenging activities. Progress repetitions, weight, and complexity of movement per pt tolerance and as indicated. .    Total Treatment Billable Duration:  45 minutes  Time In: 0840  Time Out: 1485    Ze Alan PT       Charge Capture  }Post Session Pain  PT Visit [facility] Portal  MD Guidelines  Scanned Media  Benefits  MyChart    Future Appointments   Date Time Provider Ashlie Retana   10/25/2022  8:00 AM Keely A. Henry Frankel, PT [facility]   10/28/2022  8:00 AM Keely A. Henry Frankel, PT [facility]   11/1/2022  8:00 AM Keely A. Henry Frankel, PT [facility]   11/3/2022  8:00 AM Keely A. Henry Frankel, PT [facility]   11/8/2022  8:00 AM Keely A. Henry Frankel, PT [facility]   11/11/2022  8:00 AM Keely A. Henry Frankel, PT [facility] 11/15/2022  8:00 AM Nanette Coe, PT [facility]   11/18/2022  8:00 AM Nanette Coe, PT [facility]

## 2022-10-25 ENCOUNTER — HOSPITAL ENCOUNTER (OUTPATIENT)
Dept: PHYSICAL THERAPY | Age: 27
Setting detail: RECURRING SERIES
Discharge: HOME | End: 2022-10-28

## 2022-10-25 PROCEDURE — 97110 THERAPEUTIC EXERCISES: CPT

## 2022-10-25 NOTE — PROGRESS NOTES
Rebeka Denise  : 1995  Primary:   Secondary:  [address] [address]  Phone: [phone]  Fax: [phone] Plan Frequency: 2x per week for 90 days    Plan of Care/Certification Expiration Date: 23      PT Visit Info: Total # of Visits to Date: 2  PT Insurance Information: financial adie until 3/31/23  Progress Note Counter: 2     Visit Count:  2   OUTPATIENT PHYSICAL THERAPY:OP NOTE TYPE: Treatment Note 10/25/2022       Episode  }Appt Desk             Treatment Diagnosis:    Pain in left ankle and joints of left foot (M25.572)   Pain in left foot (M79.672)   Difficulty in walking, not elsewhere classified (R26.2)  Muscle weakness (generalized) (M62.81)  Medical/Referring Diagnosis:  Closed nondisplaced fracture of fifth metatarsal bone of left foot, initial encounter [V45.996U]  Referring Physician:  Wilfredo Osorio MD MD Orders:  PT Eval and Treat   Date of Onset:  Onset Date: 22     Allergies:   Patient has no known allergies. Restrictions/Precautions:  No data recorded  No data recorded   Interventions Planned (Treatment may consist of any combination of the following):    Current Treatment Recommendations: Strengthening; ROM; Balance training; Endurance training; Gait training; Stair training; Neuromuscular re-education; Manual Therapy - Soft Tissue Mobilization; Manual Therapy - Joint Manipulation; Pain management; Home exercise program; Patient/Caregiver education & training; Modalities     Subjective Comments: Pt reports slight soreness with performance of HEP but no pain today. Initial:}  0   /10Post Session:    0    /10  Medications Last Reviewed:  10/25/2022  Updated Objective Findings:   hip mm (flexion, abduction, ER 3+/5)  Treatment   THERAPEUTIC EXERCISE: (40 minutes):  Exercises per grid below to improve mobility, strength and balance.   Required minimal-moderate visual and verbal cues to promote proper body alignment and promote proper body posture. Progressed resistance, range and complexity of movement as indicated. Date:  10/19/22 Date:  10/25/22 Date:     Activity/Exercise Parameters Parameters Parameters   Intrinsic foot arch lifts HEP     Towel scrunches HEP     Ankle TB 4 way HEP GTB 10 X 2    Tilt board  10 X 2 each direction    BAPs board  10 X 2 CW, CCW    Heel raises  10 X 2    Tandem balance  Foam 30 seconds 3 X    Step ups  4\" 10 X 2 no UE    Forward and lateral weight shifts      ASLR  #3 10 X 2    Bridges  W/ 10 # at waist 10 X 2    S/l hip abduction  #3 10 X 2    clamshells  GTB 10 x 2    Prone HS curls   #3 10 X 2    Slantboard stretch  30 seconds 3 X     Wobble board balance  30 seconds 3 X each direction    Leg press      SLS  30 seconds 3 X       MANUAL THERAPY: (0 minutes): Joint mobilization, Soft tissue mobilization and Manipulation was utilized and necessary because of the patient's restricted joint motion, painful spasm, loss of articular motion and restricted motion of soft tissue. (Used abbreviations: MET - muscle energy technique; PNF - proprioceptive neuromuscular facilitation; NMR - neuromuscular re-education; AP - anterior to posterior; PA - posterior to anterior)    MODALITIES: (0 minutes):        none        Treatment/Session Summary:    Treatment Assessment: Pt tolerated addition of exercises well today for proprioception and strengthening. Added hip strengthening as well today due to weakness noted with quick screen. (Hip flexion 3+, hip ER 3+, hip abduction 3+)     Communication/Consultation:   educated pt on POC and HEP  Equipment provided today:  None  Recommendations/Intent for next treatment session: Next visit will focus on Advancements to more challenging activities. Progress repetitions, weight, and complexity of movement per pt tolerance and as indicated.   .    Total Treatment Billable Duration:  41 minutes  Time In: 0800  Time Out: 3422    Dom Barth, PT       Charge Capture  }Post Session Pain  PT Visit Info  [facility] Portal  MD Guidelines  Scanned Media  Benefits  MyChart    Future Appointments   Date Time Provider Ashlie Lainey   10/28/2022  8:00 AM Nanette BOOTH, PT [facility]   11/1/2022  8:00 AM Nanette BOOTH, PT [facility]   11/3/2022  8:00 AM Nanette BOOTH, PT [facility]   11/8/2022  8:00 AM Nanette BOOTH, PT [facility]   11/11/2022  8:00 AM Nanette BOOTH, PT [facility]   11/15/2022  8:00 AM Nanette BOOTH, PT [facility]   11/18/2022  8:00 AM Nanette BOOTH, PT [facility]

## 2022-10-28 ENCOUNTER — HOSPITAL ENCOUNTER (OUTPATIENT)
Dept: PHYSICAL THERAPY | Age: 27
Setting detail: RECURRING SERIES
Discharge: HOME | End: 2022-10-31

## 2022-10-28 PROCEDURE — 97110 THERAPEUTIC EXERCISES: CPT

## 2022-10-28 NOTE — PROGRESS NOTES
Eulogio Michael  : 1995  Primary:   Secondary:  [address] @ [address] [address]  Phone: [phone]  Fax: [phone] Plan Frequency: 2x per week for 90 days    Plan of Care/Certification Expiration Date: 23      PT Visit Info: Total # of Visits to Date: 3  PT Insurance Information: financial adie until 3/31/23  Progress Note Counter: 3     Visit Count:  3   OUTPATIENT PHYSICAL THERAPY:OP NOTE TYPE: Treatment Note 10/28/2022       Episode  }Appt Desk             Treatment Diagnosis:    Pain in left ankle and joints of left foot (M25.572)   Pain in left foot (M79.672)   Difficulty in walking, not elsewhere classified (R26.2)  Muscle weakness (generalized) (M62.81)  Medical/Referring Diagnosis:  Closed nondisplaced fracture of fifth metatarsal bone of left foot, initial encounter [M56.094O]  Referring Physician:  Manuelito Wise MD MD Orders:  PT Eval and Treat   Date of Onset:  Onset Date: 22     Allergies:   Patient has no known allergies. Restrictions/Precautions:  No data recorded  No data recorded   Interventions Planned (Treatment may consist of any combination of the following):    Current Treatment Recommendations: Strengthening; ROM; Balance training; Endurance training; Gait training; Stair training; Neuromuscular re-education; Manual Therapy - Soft Tissue Mobilization; Manual Therapy - Joint Manipulation; Pain management; Home exercise program; Patient/Caregiver education & training; Modalities     Subjective Comments: Pt reports no pain following last session just mm fatigue. Initial:}  0   /10Post Session:    0    /10  Medications Last Reviewed:  10/28/2022  Updated Objective Findings:   hip mm (flexion, abduction, ER 3+/5)  Treatment   THERAPEUTIC EXERCISE: (40 minutes):  Exercises per grid below to improve mobility, strength and balance.   Required minimal-moderate visual and verbal cues to promote proper body alignment and promote proper body posture. Progressed resistance, range and complexity of movement as indicated. Date:  10/19/22 Date:  10/25/22 Date:  10/28/22   Activity/Exercise Parameters Parameters Parameters   Intrinsic foot arch lifts HEP     Towel scrunches HEP     Ankle TB 4 way HEP GTB 10 X 2 GTB 20 X each   Tilt board  10 X 2 each direction 20X each direction   BAPs board  10 X 2 CW, CCW 20 X CW, CCW   Heel raises  10 X 2 SL 10 X 2   Tandem balance  Foam 30 seconds 3 X D/c   Step ups  4\" 10 X 2 no UE 6\" W/ hip flexion 10 X 2   ASLR  #3 10 X 2 #3 15 X 2   Bridges  W/ 10 # at waist 10 X 2 W/ 10# at waist 15 X 2   S/l hip abduction  #3 10 X 2 #3 15 X 2   clamshells  GTB 10 x 2 GTB 15 X 2   Prone HS curls   #3 10 X 2 #3 15 X 2   Slantboard stretch  30 seconds 3 X  30 seconds 3 X    Wobble board balance  30 seconds 3 X each direction Squats 10 X each direction   SLS  30 seconds 3 X  30 seconds 3 X foam    Forward tap downs   6\" 10 X 2 - requiring B UE support   Wall squat with heel raises   10 X 2   STAR      SL deadlift      Ladder drill              MANUAL THERAPY: (0 minutes): Joint mobilization, Soft tissue mobilization and Manipulation was utilized and necessary because of the patient's restricted joint motion, painful spasm, loss of articular motion and restricted motion of soft tissue. (Used abbreviations: MET - muscle energy technique; PNF - proprioceptive neuromuscular facilitation; NMR - neuromuscular re-education; AP - anterior to posterior; PA - posterior to anterior)    MODALITIES: (0 minutes):        none        Treatment/Session Summary:    Treatment Assessment: Pt tolerated progression of exercises well. Plan to begin adding more dynamic balance exercises over next few sessions and assess response. Improved gait pattern noted this morning.      Communication/Consultation:   educated pt on POC and HEP  Equipment provided today:  None  Recommendations/Intent for next treatment session: Next visit will focus on Advancements to more challenging activities. Progress repetitions, weight, and complexity of movement per pt tolerance and as indicated. .    Total Treatment Billable Duration:  40 minutes  Time In: 0800  Time Out: 0840    Segundo Moralez PT       Charge Capture  }Post Session Pain  PT Visit Info  [employer] Portal  MD Guidelines  Scanned Media  Benefits  MyChart    Future Appointments   Date Time Provider Ashlie Lainey   11/1/2022  8:00 AM Nanette Gutierrez, PT [facility]   11/3/2022  8:00 AM Nanette Gutierrez, PT [facility]   11/8/2022  8:00 AM Nanette Gutierrez, PT [facility]   11/11/2022  8:00 AM Nanette Gutierrez, PT [facility]   11/15/2022  8:00 AM Nanette Gutierrez, PT [facility]   11/18/2022  8:00 AM Nanette Gutierrez, PT [facility]

## 2022-11-01 ENCOUNTER — HOSPITAL ENCOUNTER (OUTPATIENT)
Dept: PHYSICAL THERAPY | Age: 27
Setting detail: RECURRING SERIES
Discharge: HOME | End: 2022-11-04

## 2022-11-01 PROCEDURE — 97110 THERAPEUTIC EXERCISES: CPT

## 2022-11-01 NOTE — PROGRESS NOTES
Rebeka Denise  : 1995  Primary:   Secondary:  [address] @ [address] [address]  Phone: [phone]  Fax: [phone] Plan Frequency: 2x per week for 90 days    Plan of Care/Certification Expiration Date: 23      PT Visit Info: Total # of Visits to Date: 4  PT Insurance Information: financial adie until 3/31/23  Progress Note Counter: 4     Visit Count:  4   OUTPATIENT PHYSICAL THERAPY:OP NOTE TYPE: Treatment Note 2022       Episode  }Appt Desk             Treatment Diagnosis:    Pain in left ankle and joints of left foot (M25.572)   Pain in left foot (M79.672)   Difficulty in walking, not elsewhere classified (R26.2)  Muscle weakness (generalized) (M62.81)  Medical/Referring Diagnosis:  Closed nondisplaced fracture of fifth metatarsal bone of left foot, initial encounter [L14.707V]  Referring Physician:  Wilfredo Osorio MD MD Orders:  PT Eval and Treat   Date of Onset:  Onset Date: 22     Allergies:   Patient has no known allergies. Restrictions/Precautions:  No data recorded  No data recorded   Interventions Planned (Treatment may consist of any combination of the following):    Current Treatment Recommendations: Strengthening; ROM; Balance training; Endurance training; Gait training; Stair training; Neuromuscular re-education; Manual Therapy - Soft Tissue Mobilization; Manual Therapy - Joint Manipulation; Pain management; Home exercise program; Patient/Caregiver education & training; Modalities     Subjective Comments: Pt reports no pain after last session just mm fatigue again . Initial:}  0   /10Post Session:    0    /10  Medications Last Reviewed:  2022  Updated Objective Findings:   hip mm (flexion, abduction, ER 3+/5)  Treatment   THERAPEUTIC EXERCISE: (43 minutes):  Exercises per grid below to improve mobility, strength and balance.   Required minimal-moderate visual and verbal cues to promote proper body alignment and promote proper body posture. Progressed resistance, range and complexity of movement as indicated. Date:  10/19/22 Date:  10/25/22 Date:  10/28/22 Date:  11/1/22   Activity/Exercise Parameters Parameters Parameters    Intrinsic foot arch lifts HEP      Towel scrunches HEP      Ankle TB 4 way HEP GTB 10 X 2 GTB 20 X each BTB 20 X    Tilt board  10 X 2 each direction 20X each direction 10 X each direction   BAPs board  10 X 2 CW, CCW 20 X CW, CCW 10 X each direction   Heel raises  10 X 2 SL 10 X 2 SL 15 X 2   Step ups  4\" 10 X 2 no UE 6\" W/ hip flexion 10 X 2 BOSU step ups with hip flexion 10 X   ASLR  #3 10 X 2 #3 15 X 2 #3 15 X 2   Bridges  W/ 10 # at waist 10 X 2 W/ 10# at waist 15 X 2 10 # at waist 15 X 2   S/l hip abduction  #3 10 X 2 #3 15 X 2 #3 15 X 2   clamshells  GTB 10 x 2 GTB 15 X 2 BTB 10 X 2   Prone HS curls   #3 10 X 2 #3 15 X 2 #3 15 X 2   Slantboard stretch  30 seconds 3 X  30 seconds 3 X  30 seconds 3 X    Wobble board balance  30 seconds 3 X each direction Squats 10 X each direction    SLS  30 seconds 3 X  30 seconds 3 X foam  30 X on foam w/ ball throw   Forward tap downs   6\" 10 X 2 - requiring B UE support    Wall squat with heel raises   10 X 2    STAR    3X   SL deadlift       Ladder drill       DL jumping    On 4\" step 10 X   TB side steps                       MANUAL THERAPY: (0 minutes): Joint mobilization, Soft tissue mobilization and Manipulation was utilized and necessary because of the patient's restricted joint motion, painful spasm, loss of articular motion and restricted motion of soft tissue. (Used abbreviations: MET - muscle energy technique; PNF - proprioceptive neuromuscular facilitation; NMR - neuromuscular re-education; AP - anterior to posterior; PA - posterior to anterior)    MODALITIES: (0 minutes):        none        Treatment/Session Summary:    Treatment Assessment: Pt tolerated progression of exercises well today.  Pt was able to tolerate addition of more dynamic exercises and initiation of jumping today. D/C seated board exercises next visit if no increased soreness and pain. Provided pt with written note and reviewed caution with return to volleyball with restrictions. Pt verbalized understanding. Communication/Consultation:   educated pt on POC and HEP  Equipment provided today:  None  Recommendations/Intent for next treatment session: Next visit will focus on Advancements to more challenging activities. Progress repetitions, weight, and complexity of movement per pt tolerance and as indicated. .    Total Treatment Billable Duration:  45 minutes  Time In: 0800  Time Out: 0840    Agustina Montgomery, PT       Charge Capture  }Post Session Pain  PT Visit Info  [employer] Portal  MD Guidelines  Scanned Media  Benefits  MyChart    Future Appointments   Date Time Provider Ashlie Retana   11/3/2022  8:00 AM Nanette Antoine, PT [facility]   11/8/2022  8:00 AM Nanette Antoine, PT [facility]   11/11/2022  8:00 AM Nanette Antoine, PT [facility]   11/15/2022  8:00 AM Nanette Antoine, PT [facility]   11/18/2022  8:00 AM Nanette Antoine, PT [facility]

## 2022-11-03 ENCOUNTER — HOSPITAL ENCOUNTER (OUTPATIENT)
Dept: PHYSICAL THERAPY | Age: 27
Setting detail: RECURRING SERIES
Discharge: HOME | End: 2022-11-06

## 2022-11-03 PROCEDURE — 97110 THERAPEUTIC EXERCISES: CPT

## 2022-11-03 NOTE — PROGRESS NOTES
Eve Foy  : 1995  Primary:   Secondary:  Iker Mccain @ [address] [address]  Phone: [phone]  Fax: [phone] Plan Frequency: 2x per week for 90 days    Plan of Care/Certification Expiration Date: 23      PT Visit Info: Total # of Visits to Date: 5  PT Insurance Information: financial adie until 3/31/23  Progress Note Counter: 5     Visit Count:  5   OUTPATIENT PHYSICAL THERAPY:OP NOTE TYPE: Treatment Note 11/3/2022       Episode  }Appt Desk             Treatment Diagnosis:    Pain in left ankle and joints of left foot (M25.572)   Pain in left foot (M33.832)   Difficulty in walking, not elsewhere classified (R26.2)  Muscle weakness (generalized) (M62.81)  Medical/Referring Diagnosis:  Closed nondisplaced fracture of fifth metatarsal bone of left foot, initial encounter [P84.266A]  Referring Physician:  Brooklyn Ashby MD MD Orders:  PT Eval and Treat   Date of Onset:  Onset Date: 22     Allergies:   Patient has no known allergies. Restrictions/Precautions:  No data recorded  No data recorded   Interventions Planned (Treatment may consist of any combination of the following):    Current Treatment Recommendations: Strengthening; ROM; Balance training; Endurance training; Gait training; Stair training; Neuromuscular re-education; Manual Therapy - Soft Tissue Mobilization; Manual Therapy - Joint Manipulation; Pain management; Home exercise program; Patient/Caregiver education & training; Modalities     Subjective Comments: Pt reports no pain after last session just mm fatigue again. Pt reports playing volleyball with restrictions set by PT with no increased pain or discomfort. Did state that went for long walk yesterday and felt discomfort/pain at lower back but not at foot.       Initial:}  0   /10Post Session:    0    /10  Medications Last Reviewed:  11/3/2022  Updated Objective Findings:  None Today  Treatment THERAPEUTIC EXERCISE: (42 minutes):  Exercises per grid below to improve mobility, strength and balance. Required minimal-moderate visual and verbal cues to promote proper body alignment and promote proper body posture. Progressed resistance, range and complexity of movement as indicated.        Date:  10/19/22 Date:  10/25/22 Date:  10/28/22 Date:  11/1/22 Date:  11/3/22   Activity/Exercise Parameters Parameters Parameters     Intrinsic foot arch lifts HEP       Towel scrunches HEP       Ankle TB 4 way HEP GTB 10 X 2 GTB 20 X each BTB 20 X  BTB 25 X each   Tilt board  10 X 2 each direction 20X each direction 10 X each direction D/C   BAPs board  10 X 2 CW, CCW 20 X CW, CCW 10 X each direction D/C   Heel raises  10 X 2 SL 10 X 2 SL 15 X 2 SL 15 X 2   Step ups  4\" 10 X 2 no UE 6\" W/ hip flexion 10 X 2 BOSU step ups with hip flexion 10 X BOSU steps ups w/ hip flexion 10 X 2   ASLR  #3 10 X 2 #3 15 X 2 #3 15 X 2 #4 10 X 2   Bridges  W/ 10 # at waist 10 X 2 W/ 10# at waist 15 X 2 10 # at waist 15 X 2 On SB 10 X 2   S/l hip abduction  #3 10 X 2 #3 15 X 2 #3 15 X 2 #4 10 X 2   clamshells  GTB 10 x 2 GTB 15 X 2 BTB 10 X 2 BTB 15 X 2   Prone HS curls   #3 10 X 2 #3 15 X 2 #3 15 X 2 #4 10 X 2   Slantboard stretch  30 seconds 3 X  30 seconds 3 X  30 seconds 3 X  30 seconds 3X    Wobble board balance  30 seconds 3 X each direction Squats 10 X each direction     SLS  30 seconds 3 X  30 seconds 3 X foam  30 X on foam w/ ball throw 30 X w/ soccer ball passes   Forward tap downs   6\" 10 X 2 - requiring B UE support     Wall squat with heel raises   10 X 2     STAR    3X    SL deadlift     10 X   Ladder drill     In<>out forward & lateral   DL jumping    On 4\" step 10 X On/off 6\" 10 X   TB side steps        Jumping forward and side to side over line        Shuffle drill     5 min   High jumps DL     10 X      MANUAL THERAPY: (0 minutes): Joint mobilization, Soft tissue mobilization and Manipulation was utilized and necessary because of the patient's restricted joint motion, painful spasm, loss of articular motion and restricted motion of soft tissue. (Used abbreviations: MET - muscle energy technique; PNF - proprioceptive neuromuscular facilitation; NMR - neuromuscular re-education; AP - anterior to posterior; PA - posterior to anterior)    MODALITIES: (0 minutes):        none        Treatment/Session Summary:    Treatment Assessment: Discontinued ankle ROM exercise and will continue with progression into plyometrics to pt tolerance. Pt did report \"feeling\" lateral aspect of foot with light cutting drill and repetative high jumps. Continue to assess and modify as needed. Communication/Consultation:   educated pt on POC and HEP  Equipment provided today:  None  Recommendations/Intent for next treatment session: Next visit will focus on Advancements to more challenging activities. Progress repetitions, weight, and complexity of movement per pt tolerance and as indicated. .    Total Treatment Billable Duration:  42 minutes  Time In: 0800  Time Out: 0842    Tdedy Stewart, PT       Charge Capture  }Post Session Pain  PT Visit Info  [employer] Portal  MD Guidelines  Scanned Media  Benefits  MyChart    Future Appointments   Date Time Provider Ashlie Retana   11/8/2022  8:00 AM Nanette Scott, PT [facility]   11/11/2022  8:00 AM Nanette Lopess, PT [facility]   11/15/2022  8:00 AM Nanette Lopess, PT [facility]   11/18/2022  8:00 AM Nanette Scott, PT [facility]

## 2022-11-08 ENCOUNTER — HOSPITAL ENCOUNTER (OUTPATIENT)
Dept: PHYSICAL THERAPY | Age: 27
Setting detail: RECURRING SERIES
Discharge: HOME | End: 2022-11-11

## 2022-11-08 PROCEDURE — 97112 NEUROMUSCULAR REEDUCATION: CPT

## 2022-11-08 PROCEDURE — 97110 THERAPEUTIC EXERCISES: CPT

## 2022-11-08 NOTE — PROGRESS NOTES
Eve Foy  : 1995  Primary:   Secondary:  [address] [address]  Phone: [phone]  Fax: [phone] Plan Frequency: 2x per week for 90 days    Plan of Care/Certification Expiration Date: 23      PT Visit Info: Total # of Visits to Date: 6  PT Insurance Information: financial adie until 3/31/23  Progress Note Counter: 6     Visit Count:  6   OUTPATIENT PHYSICAL THERAPY:OP NOTE TYPE: Treatment Note 2022       Episode  }Appt Desk             Treatment Diagnosis:    Pain in left ankle and joints of left foot (M25.572)   Pain in left foot (M79.672)   Difficulty in walking, not elsewhere classified (R26.2)  Muscle weakness (generalized) (M62.81)  Medical/Referring Diagnosis:  Closed nondisplaced fracture of fifth metatarsal bone of left foot, initial encounter [L25.368Q]  Referring Physician:  Brooklyn Ashby MD MD Orders:  PT Eval and Treat   Date of Onset:  Onset Date: 22     Allergies:   Patient has no known allergies. Restrictions/Precautions:  No data recorded  No data recorded   Interventions Planned (Treatment may consist of any combination of the following):    Current Treatment Recommendations: Strengthening; ROM; Balance training; Endurance training; Gait training; Stair training; Neuromuscular re-education; Manual Therapy - Soft Tissue Mobilization; Manual Therapy - Joint Manipulation; Pain management; Home exercise program; Patient/Caregiver education & training; Modalities     Subjective Comments: Pt reports playing volleyball tournament over the weekend with no increased pain only fatigue at foot. Initial:}  0   /10Post Session:    0    /10  Medications Last Reviewed:  2022  Updated Objective Findings:  None Today  Treatment   THERAPEUTIC EXERCISE: (15 minutes):  Exercises per grid below to improve mobility, strength and balance.   Required minimal-moderate visual and verbal cues to promote proper body alignment and promote proper body posture. Progressed resistance, range and complexity of movement as indicated. NEUROMUSCULAR RE-EDUCATION: (25 minutes):    Exercise/activities per grid below to improve balance, coordination, kinesthetic sense, and proprioception. Required minimal visual, verbal, and tactile cues to promote dynamic balance in standing, promote coordination of left, lower extremity(s), and promote motor control of left, lower extremity(s).        Date:  10/19/22 Date:  10/25/22 Date:  10/28/22 Date:  11/1/22 Date:  11/3/22 Date:  11/8/22   Activity/Exercise Parameters Parameters Parameters      Intrinsic foot arch lifts HEP        Towel scrunches HEP        Ankle TB 4 way HEP GTB 10 X 2 GTB 20 X each BTB 20 X  BTB 25 X each BTB 30 X   Heel raises  10 X 2 SL 10 X 2 SL 15 X 2 SL 15 X 2 SL 15 X 2   Step ups  4\" 10 X 2 no UE 6\" W/ hip flexion 10 X 2 BOSU step ups with hip flexion 10 X BOSU steps ups w/ hip flexion 10 X 2    ASLR  #3 10 X 2 #3 15 X 2 #3 15 X 2 #4 10 X 2 #4 15 X 2   Bridges  W/ 10 # at waist 10 X 2 W/ 10# at waist 15 X 2 10 # at waist 15 X 2 On SB 10 X 2 On SB 15 X 2   S/l hip abduction  #3 10 X 2 #3 15 X 2 #3 15 X 2 #4 10 X 2 #4 15 X 2   clamshells  GTB 10 x 2 GTB 15 X 2 BTB 10 X 2 BTB 15 X 2 BTB 15 X 2   Prone HS curls   #3 10 X 2 #3 15 X 2 #3 15 X 2 #4 10 X 2 #4 15 X 2   Slantboard stretch  30 seconds 3 X  30 seconds 3 X  30 seconds 3 X  30 seconds 3X  30 seconds 3 X   Wobble board balance  30 seconds 3 X each direction Squats 10 X each direction   Squats 10 X each direction   SLS  30 seconds 3 X  30 seconds 3 X foam  30 X on foam w/ ball throw 30 X w/ soccer ball passes 30 X bounces with RTB perturbations   Forward tap downs   6\" 10 X 2 - requiring B UE support      Wall squat with heel raises   10 X 2   15 X 2   STAR    3X  3X   SL deadlift     10 X    Ladder drill     In<>out forward & lateral    DL jumping    On 4\" step 10 X On/off 6\" 10 X On/off 8\" 10 X 2   TB side steps         Jumping forward and side to side over line      10 X 2 each    Shuffle drill     5 min 5 min   High jumps DL     10 X  10 X      MANUAL THERAPY: (0 minutes): Joint mobilization, Soft tissue mobilization and Manipulation was utilized and necessary because of the patient's restricted joint motion, painful spasm, loss of articular motion and restricted motion of soft tissue. (Used abbreviations: MET - muscle energy technique; PNF - proprioceptive neuromuscular facilitation; NMR - neuromuscular re-education; AP - anterior to posterior; PA - posterior to anterior)    MODALITIES: (0 minutes):        none        Treatment/Session Summary:    Treatment Assessment: Pt tolerated progression of exercises well. No increased pain this session. Discussed slow transition with return to running. Communication/Consultation:   educated pt on POC and HEP  Equipment provided today:  None  Recommendations/Intent for next treatment session: Next visit will focus on Advancements to more challenging activities. Progress repetitions, weight, and complexity of movement per pt tolerance and as indicated. .    Total Treatment Billable Duration:  40 minutes  Time In: 0800  Time Out: 0840    Laurel Allen PT       Charge Capture  }Post Session Pain  PT Visit Info  [employer] Portal  MD Guidelines  Scanned Media  Benefits  MyChart    Future Appointments   Date Time Provider Ashlie Retana   11/11/2022  8:00 AM Nanette Stapleton, PT [facility]   11/15/2022  8:00 AM Nanette Stapleton, PT [facility]   11/18/2022  8:00 AM Nanette Stapleton, PT [facility]

## 2022-11-11 ENCOUNTER — APPOINTMENT (OUTPATIENT)
Dept: PHYSICAL THERAPY | Age: 27
End: 2022-11-11

## 2022-11-15 ENCOUNTER — HOSPITAL ENCOUNTER (OUTPATIENT)
Dept: PHYSICAL THERAPY | Age: 27
Setting detail: RECURRING SERIES
Discharge: HOME | End: 2022-11-18

## 2022-11-15 PROCEDURE — 97110 THERAPEUTIC EXERCISES: CPT

## 2022-11-15 PROCEDURE — 97112 NEUROMUSCULAR REEDUCATION: CPT

## 2022-11-15 NOTE — PROGRESS NOTES
Louise Trujillo  : 1995  Primary:   Secondary:  [address] [address]  Phone: [phone]  Fax: [phone] Plan Frequency: 2x per week for 90 days    Plan of Care/Certification Expiration Date: 23      PT Visit Info: Total # of Visits to Date: 7  PT Insurance Information: financial adie until 3/31/23  Progress Note Counter: 7     Visit Count:  7   OUTPATIENT PHYSICAL THERAPY:OP NOTE TYPE: Treatment Note 11/15/2022       Episode  }Appt Desk             Treatment Diagnosis:    Pain in left ankle and joints of left foot (M25.572)   Pain in left foot (M79.672)   Difficulty in walking, not elsewhere classified (R26.2)  Muscle weakness (generalized) (M62.81)  Medical/Referring Diagnosis:  Closed nondisplaced fracture of fifth metatarsal bone of left foot, initial encounter [Z43.800J]  Referring Physician:  Cristina Patton MD MD Orders:  PT Eval and Treat   Date of Onset:  Onset Date: 22     Allergies:   Patient has no known allergies. Restrictions/Precautions:  No data recorded  No data recorded   Interventions Planned (Treatment may consist of any combination of the following):    Current Treatment Recommendations: Strengthening; ROM; Balance training; Endurance training; Gait training; Stair training; Neuromuscular re-education; Manual Therapy - Soft Tissue Mobilization; Manual Therapy - Joint Manipulation; Pain management; Home exercise program; Patient/Caregiver education & training; Modalities     Subjective Comments: Pt reports going for a run for 1 mile (walk and run combination) without any pain- pt did report discomfort requiring ice 2X that afternoon but no pain the following day.   Initial:}  0   /10Post Session:    0    /10  Medications Last Reviewed:  11/15/2022  Updated Objective Findings:  None Today  Treatment   THERAPEUTIC EXERCISE: (15 minutes):  Exercises per grid below to improve mobility, strength and balance. Required minimal-moderate visual and verbal cues to promote proper body alignment and promote proper body posture. Progressed resistance, range and complexity of movement as indicated. NEUROMUSCULAR RE-EDUCATION: (30 minutes):    Exercise/activities per grid below to improve balance, coordination, kinesthetic sense, and proprioception. Required minimal visual, verbal, and tactile cues to promote dynamic balance in standing, promote coordination of left, lower extremity(s), and promote motor control of left, lower extremity(s).        Date:  11/1/22 Date:  11/3/22 Date:  11/8/22 Date:  11/15/22   Activity/Exercise       Intrinsic foot arch lifts       Towel scrunches       Ankle TB 4 way BTB 20 X  BTB 25 X each BTB 30 X BTB 30 X each   Heel raises SL 15 X 2 SL 15 X 2 SL 15 X 2 SL 15 X 2   Step ups BOSU step ups with hip flexion 10 X BOSU steps ups w/ hip flexion 10 X 2  BOSU step ups w/ hip flexion 10 X 2   ASLR #3 15 X 2 #4 10 X 2 #4 15 X 2 #5 10 x 2   Bridges 10 # at waist 15 X 2 On SB 10 X 2 On SB 15 X 2 On SB 15 X 2   S/l hip abduction #3 15 X 2 #4 10 X 2 #4 15 X 2 #5 10 X 2   clamshells BTB 10 X 2 BTB 15 X 2 BTB 15 X 2 BTB 15 X 2   Prone HS curls  #3 15 X 2 #4 10 X 2 #4 15 X 2 #5 10 X 2   Slantboard stretch 30 seconds 3 X  30 seconds 3X  30 seconds 3 X    Wobble board balance   Squats 10 X each direction    SLS 30 X on foam w/ ball throw 30 X w/ soccer ball passes 30 X bounces with RTB perturbations 30 bounces w/ RTB perturbations   Forward tap downs    6\" 10 X forward, 10 X lateral    Wall squat with heel raises   15 X 2 15 X 2    STAR 3X  3X    SL deadlift  10 X     Ladder drill  In<>out forward & lateral  In<>out 2 X   DL jumping On 4\" step 10 X On/off 6\" 10 X On/off 8\" 10 X 2    TB side steps    BTB 20 ft X 2 thighs & ankles   Jumping forward and side to side over line   10 X 2 each  10 X 2 each fwd, lateral   Shuffle drill  5 min 5 min 5 min   High jumps DL  10 X  10 X  10 X 2 Long  broad jumps    20 ft X 2      MANUAL THERAPY: (0 minutes): Joint mobilization, Soft tissue mobilization and Manipulation was utilized and necessary because of the patient's restricted joint motion, painful spasm, loss of articular motion and restricted motion of soft tissue. (Used abbreviations: MET - muscle energy technique; PNF - proprioceptive neuromuscular facilitation; NMR - neuromuscular re-education; AP - anterior to posterior; PA - posterior to anterior)    MODALITIES: (0 minutes):        none        Treatment/Session Summary:    Treatment Assessment: Pt continues to make excellent progress. Continued work on plyometrics for return to sport without any discomfort this session. Communication/Consultation:   educated pt on POC and HEP  Equipment provided today:  None  Recommendations/Intent for next treatment session: Next visit will focus on Advancements to more challenging activities. Progress repetitions, weight, and complexity of movement per pt tolerance and as indicated. .    Total Treatment Billable Duration:  45 minutes  Time In: 0800  Time Out: 0845    Segundo Moralez, PT       Charge Capture  }Post Session Pain  PT Visit Info  [employer] Portal  MD Guidelines  Scanned Media  Benefits  MyChart    Future Appointments   Date Time Provider Ashlie Retana   11/18/2022  8:00 AM Nanette BOOTH, PT [facility]

## 2022-11-18 ENCOUNTER — HOSPITAL ENCOUNTER (OUTPATIENT)
Dept: PHYSICAL THERAPY | Age: 27
Setting detail: RECURRING SERIES
Discharge: HOME | End: 2022-11-21

## 2022-11-18 PROCEDURE — 97112 NEUROMUSCULAR REEDUCATION: CPT

## 2022-11-18 PROCEDURE — 97110 THERAPEUTIC EXERCISES: CPT

## 2022-11-18 NOTE — PROGRESS NOTES
Jair Hutchinson  : 1995  Primary:   Secondary:  Sherry Reveles @ [address]  Phone: [phone]  Fax: [phone] Plan Frequency: 2x per week for 90 days    Plan of Care/Certification Expiration Date: 23      PT Visit Info: Total # of Visits to Date: 8  PT Insurance Information: financial adie until 3/31/23  Progress Note Counter: 8     Visit Count:  8   OUTPATIENT PHYSICAL THERAPY:OP NOTE TYPE: Treatment Note 2022       Episode  }Appt Desk             Treatment Diagnosis:    Pain in left ankle and joints of left foot (M25.572)   Pain in left foot (M79.672)   Difficulty in walking, not elsewhere classified (R26.2)  Muscle weakness (generalized) (M62.81)  Medical/Referring Diagnosis:  Closed nondisplaced fracture of fifth metatarsal bone of left foot, initial encounter [X44.444V]  Referring Physician:  Baldemar Carrero MD MD Orders:  PT Eval and Treat   Date of Onset:  Onset Date: 22     Allergies:   Patient has no known allergies. Restrictions/Precautions:  No data recorded  No data recorded   Interventions Planned (Treatment may consist of any combination of the following):    Current Treatment Recommendations: Strengthening; ROM; Balance training; Endurance training; Gait training; Stair training; Neuromuscular re-education; Manual Therapy - Soft Tissue Mobilization; Manual Therapy - Joint Manipulation; Pain management; Home exercise program; Patient/Caregiver education & training; Modalities     Subjective Comments: Pt reports returning to climbing yesterday without any difficulty. States she is being very careful. Was also able to do another run without difficulty but slowly adding time and distance.   Initial:}  0   /10Post Session:    0    /10  Medications Last Reviewed:  2022  Updated Objective Findings:  None Today  Treatment   THERAPEUTIC EXERCISE: (15 minutes):  Exercises per grid below to improve mobility, strength and balance. Required minimal-moderate visual and verbal cues to promote proper body alignment and promote proper body posture. Progressed resistance, range and complexity of movement as indicated. NEUROMUSCULAR RE-EDUCATION: (25 minutes):    Exercise/activities per grid below to improve balance, coordination, kinesthetic sense, and proprioception. Required minimal visual, verbal, and tactile cues to promote dynamic balance in standing, promote coordination of left, lower extremity(s), and promote motor control of left, lower extremity(s).        Date:  11/1/22 Date:  11/3/22 Date:  11/8/22 Date:  11/15/22 Date:  11/18/22   Activity/Exercise        Intrinsic foot arch lifts        Towel scrunches        Ankle TB 4 way BTB 20 X  BTB 25 X each BTB 30 X BTB 30 X each BTB 30 X each   Heel raises SL 15 X 2 SL 15 X 2 SL 15 X 2 SL 15 X 2 SL 15 X 2   Step ups BOSU step ups with hip flexion 10 X BOSU steps ups w/ hip flexion 10 X 2  BOSU step ups w/ hip flexion 10 X 2 BOSU step ups w/ hip flexion 10 X 2   ASLR #3 15 X 2 #4 10 X 2 #4 15 X 2 #5 10 x 2 #5 15 X 2   Bridges 10 # at waist 15 X 2 On SB 10 X 2 On SB 15 X 2 On SB 15 X 2 On SB 10 X 2 with roll in and out   S/l hip abduction #3 15 X 2 #4 10 X 2 #4 15 X 2 #5 10 X 2 #5 15 X 2   clamshells BTB 10 X 2 BTB 15 X 2 BTB 15 X 2 BTB 15 X 2 BTB 15 X 2   Prone HS curls  #3 15 X 2 #4 10 X 2 #4 15 X 2 #5 10 X 2 #5 15 X 2   Slantboard stretch 30 seconds 3 X  30 seconds 3X  30 seconds 3 X  30 seconds 3X    Wobble board balance   Squats 10 X each direction     SLS 30 X on foam w/ ball throw 30 X w/ soccer ball passes 30 X bounces with RTB perturbations 30 bounces w/ RTB perturbations 30 bounces w/ RTB perturbations   Forward tap downs    6\" 10 X forward, 10 X lateral     Wall squat with heel raises   15 X 2 15 X 2  15 X 2   STAR 3X  3X     SL deadlift  10 X      Ladder drill  In<>out forward & lateral  In<>out 2 X In <>out X 2   DL jumping On 4\" step 10 X On/off 6\" 10 X On/off 8\" 10 X 2  On/off 8\" 10 X    TB side steps    BTB 20 ft X 2 thighs & ankles    Jumping forward and side to side over line   10 X 2 each  10 X 2 each fwd, lateral    Shuffle drill  5 min 5 min 5 min    High jumps DL  10 X  10 X  10 X 2 10 X 2   Long  broad jumps    20 ft X 2  20 ft X 2   Bosu lateral quick steps     10 X      MANUAL THERAPY: (0 minutes): Joint mobilization, Soft tissue mobilization and Manipulation was utilized and necessary because of the patient's restricted joint motion, painful spasm, loss of articular motion and restricted motion of soft tissue. (Used abbreviations: MET - muscle energy technique; PNF - proprioceptive neuromuscular facilitation; NMR - neuromuscular re-education; AP - anterior to posterior; PA - posterior to anterior)    MODALITIES: (0 minutes):        none        Treatment/Session Summary:    Treatment Assessment: Discussed with pt and plan is to take 2 weeks off PT at this point and return to climbing and sports over the holiday week. Pt to call clinic if any increased pain with return to all activities and needs to continue PT. Pt in agreement with this plan. Communication/Consultation:   educated pt on POC and HEP  Equipment provided today:  None  Recommendations/Intent for next treatment session: Next visit will focus on Advancements to more challenging activities. Progress repetitions, weight, and complexity of movement per pt tolerance and as indicated. .    Total Treatment Billable Duration:  40 minutes  Time In: 0800  Time Out: 0840    Benji Hanks, PT       Charge Capture  }Post Session Pain  PT Visit Info  [employer] Portal  MD Guidelines  Scanned Media  Benefits  MyChart    No future appointments.

## 2022-12-12 NOTE — THERAPY EVALUATION
Eve Foy  : 1995  Primary:   Secondary:  [address] [address]  Phone: [phone]  Fax: [phone] Plan Frequency: 2x per week for 90 days  Plan of Care/Certification Expiration Date: 23      PT Visit Info: Total # of Visits to Date: 8  PT Insurance Information: financial adie until 3/31/23  Progress Note Counter: 8      Visit Count:  7    OUTPATIENT PHYSICAL THERAPY:OP NOTE TYPE: Discontinuation Summary 11/15/2022               Episode  Appt Desk         Treatment Diagnosis:      Pain in left ankle and joints of left foot (M25.572)   Pain in left foot (M79.672)   Difficulty in walking, not elsewhere classified (R26.2)  Muscle weakness (generalized) (M62.81)      Medical/Referring Diagnosis:  Closed nondisplaced fracture of fifth metatarsal bone of left foot, initial encounter [F15.902Q]  Referring Physician:  Brooklyn Ashby MD MD Orders:  PT Eval and Treat   Return MD Appt:  TBD by pt  Date of Onset:  Onset Date: 22     Allergies:  Patient has no known allergies. Restrictions/Precautions:  WBAT, can wean from walker boot         Medications Last Reviewed:  11/15/2022     SUBJECTIVE   History of Injury/Illness (Reason for Referral):  On 22, pt was playing soccer and felt like she stepped wrong and felt immediate pain. Pt unable to tolerate Wbing following. Pt went to ER after 1.5 days following injury. Pt was then referred to Dr. Sofie Harrison. Pt in boot for approximately 5-6 weeks and began weaning since Friday (3-4 days now). Pt continues with limp more due to weakness than painful. Pt still wearing lace up ankle brace. Pt c/c of weakness at both L foot and ankle, no pain. Pt had prior ankle sprain 2 years ago. Pt is avid runner, rock climber, soccer, volleyball.  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 female who presents with No chief complaint on file. .  Left fifth metatarsal base fracture, doing well with very little pain in her walker boot. Weight-bearing status: WBAT        Return to work/work restrictions: none  No medications given     She will wean from her walker boot and start advancing her exercise regimen. She try to go to supervised physical therapy. She can return as needed. Patient Stated Goal(s):  \"Get back to all activities without limitations \"  Initial:   0   /10 Post Session:     0 /10  Past Medical History/Comorbidities:   Ms. Malathi Gary  has no past medical history on file. Ms. Malathi Gary  has no past surgical history on file. Social History/Living Environment:    Lives in girls dorm- works at [address] [place] [address]     Prior Level of Function/Work/Activity:    Very active, runner, volleyball, soccer, rock climber        Learning:   Does the patient/guardian have any barriers to learning?: No barriers  Will there be a co-learner?: No  What is the preferred language of the patient/guardian?: English  How does the patient/guardian prefer to learn new concepts?: Listening; Demonstration; Pictures/Videos; Reading     Fall Risk Scale:    Brown Total Score: 0  Brown Fall Risk: Low (0-24)           OBJECTIVE   Ankle Objective      BALANCE (SLS) Date:   10/19/22 Date: Date:   Right 30 seconds     Left 4 seconds          MEASUREMENTS:          Date:  10/19/22  Date:  Date:     Right Left Right Left Right Left   Ankle Circumference 18.8 cm 18.8 cm       Figure 8 48.4 48.2         AROM/PROM         Joint: Date:  Date:  Date:    Active LE ROM Right Left Right Left Right Left   Hip Flexion         Hip Extension         Hip IR         Hip ER         Knee Extension         Knee Flexion         Ankle DF 20 degrees 15 degrees       Ankle PF 30 degrees 30 degrees       Ankle IV 30 degrees 25 degrees       Ankle EV 35 degrees 20 degrees       Passive LE ROM         Ankle PF [facility] WFL       Ankle DF [facility] WFL       Ankle IV [facility] WFL       Ankle EV [facility] WFL                   STRENGTH         Joint: Date: 10/19/22    Date:  Date:     Right Left Right Left Right Left   Hip Abduction         Hip Adduction         Hip IR         Hip ER         Hip Flexion         Knee Extension         Knee Flexion         Ankle DF 5/5 4/5       Ankle PF 5/5 3+/5       Ankle IV 5/5 4/5       Ankle EV 5/5 3/5           PALPATION Date:  10/19/22   TTP No TTP    TONE None noted       ASSESSMENT   Discontinuation Summary:  Pt has been seen for a total of 8 PT sessions to date with last visit being on 11/15/22. Pt pain at that time had completely resolved and had returned to her sporting activities without increased pain. PT recommended to take 2 weeks off PT to resume all activities and return if any discomfort or pain returned. Pt has not returned at this time. Will discharge case. Initial Assessment:  Ms. Chani Delcid presents to physical therapy with c/o L foot instability and weakness following 5th MTP fracture. Pt demonstrating decreased strength, ROM, joint mobility, functional mobility affecting participation in ADLs and functional mobility at this time. Patient will benefit from skilled physical therapy for manual therapeutic techniques (as appropriate), therapeutic exercises and activities, balance and comprehensive home exercises program to address current impairments and functional limitations. Anitha Elena will benefit from skilled PT (medically necessary) in order to address above deficits affecting participation in basic ADLs and overall functional tolerance. Problem List: (Impacting functional limitations): Body Structures, Functions, Activity Limitations Requiring Skilled Therapeutic Intervention: Decreased functional mobility ; Decreased ROM; Decreased tolerance to work activity; Decreased strength; Decreased endurance; Decreased balance;  Increased pain     Therapy Prognosis: Therapy Prognosis: Good     Assessment Complexity:      PLAN   Effective Dates: 10/19/22 TO Plan of Care/Certification Expiration Date: 01/17/23     Frequency/Duration: Plan Frequency: 2x per week for 90 days     Interventions Planned (Treatment may consist of any combination of the following):    Current Treatment Recommendations: Strengthening; ROM; Balance training; Endurance training; Gait training; Stair training; Neuromuscular re-education; Manual Therapy - Soft Tissue Mobilization; Manual Therapy - Joint Manipulation; Pain management; Home exercise program; Patient/Caregiver education & training; Modalities     Goals: (Goals have been discussed and agreed upon with patient.)  Ankle Goals  SHORT-TERM FUNCTIONAL GOALS: Time Frame: 4 weeks  Teresa Momin will be compliant with home exercise program within 4 weeks in order to improve active participation with management of patient's symptoms and/or functional deficits. [place] Bonitomaureen Miranda will demonstrate 15 second of SLS on L LE for improvements in balance and strength at L foot  Shira Miranda will be able to stand >=60 minutes with <2010 pain to feet in order to participate in work tasks without issues/compromise. Shira Miranda will demonstrate 5/5 ankle strength for improved gait mechanics  Teresa Momin  will improve L ankle dorsiflexion AROM from  to 20 in order to show improvement in ankle ROM and tolerance for functional activity. Shira Miranda will be report improved score on the Foot and Ankle Ability Measure 70 to indicate improvement in functional independence. DISCHARGE GOALS: Time Frame: 12 weeks  Teresa Momin will be independent with home exercise program within 12 weeks in order to improve independence with management of patient's symptoms and/or functional deficits.    Shira Miranda will report no limitations with standing ability for work tasks (5-6 hours)  Sandro Trujillo Nava Osman will be able to jump up and down from 8\" step for return to sports  Jie Zavala will be report improved score on the Foot and Ankle Ability Measure 80 to indicate improvement in functional independence. Dhavalrogen Sally will demonstrate 30 seconds SLS while performing dynamic activity with normalized ankle strategy for return to sport           Outcome Measure: Tool Used: FOOT AND ANKLE ABILITY MEASURE  Score:  Initial: 59/84 Most Recent: X (Date: -- )   Interpretation of Score: For the \"Activities of Daily Living\", there are 21 questions each scored on a 5 point scale with 0 representing \"Unable to do\" and 4 representing \"No difficulty\". The lower the score, the greater the functional disability. 84/84 represents no disability. Minimal detectable change is 5.7 points. With the addition of the 8 questions in the \"Sports Subscale,\" there are 29 questions, each scored on a 5 point scale with 0 representing \"Unable to do\" and 4 representing \"No difficulty\". The lower the score, the greater the functional disability. 116/116 represents no disability. Minimal detectable change is 12.3 points. Total Duration:  Time In: 0800  Time Out: 0845    Regarding Teresa Paige therapy, I certify that the treatment plan above will be carried out by a therapist or under their direction. Thank you for this referral,  Dom Barth, PT     Referring Physician Signature: Puneet Eddy MD No Signature is Required for this note.         Post Session Pain  Charge Capture  PT Visit Info MD Guidelines  [place]